# Patient Record
Sex: MALE | Race: OTHER | HISPANIC OR LATINO | URBAN - METROPOLITAN AREA
[De-identification: names, ages, dates, MRNs, and addresses within clinical notes are randomized per-mention and may not be internally consistent; named-entity substitution may affect disease eponyms.]

---

## 2021-11-28 ENCOUNTER — EMERGENCY (EMERGENCY)
Facility: HOSPITAL | Age: 36
LOS: 1 days | Discharge: HOME | End: 2021-11-28
Attending: STUDENT IN AN ORGANIZED HEALTH CARE EDUCATION/TRAINING PROGRAM
Payer: MEDICAID

## 2021-11-28 VITALS
RESPIRATION RATE: 18 BRPM | WEIGHT: 181.66 LBS | DIASTOLIC BLOOD PRESSURE: 69 MMHG | SYSTOLIC BLOOD PRESSURE: 115 MMHG | OXYGEN SATURATION: 98 % | TEMPERATURE: 98 F | HEART RATE: 85 BPM

## 2021-11-28 PROCEDURE — 99283 EMERGENCY DEPT VISIT LOW MDM: CPT | Mod: 25

## 2021-11-28 PROCEDURE — 93005 ELECTROCARDIOGRAM TRACING: CPT

## 2021-11-28 PROCEDURE — 99284 EMERGENCY DEPT VISIT MOD MDM: CPT

## 2021-11-28 RX ORDER — ACETAMINOPHEN 500 MG
975 TABLET ORAL ONCE
Refills: 0 | Status: COMPLETED | OUTPATIENT
Start: 2021-11-28 | End: 2021-11-28

## 2021-11-28 RX ADMIN — Medication 975 MILLIGRAM(S): at 20:00

## 2021-11-28 RX ADMIN — Medication 975 MILLIGRAM(S): at 17:44

## 2021-11-28 NOTE — ED PROVIDER NOTE - PROGRESS NOTE DETAILS
Pt feeling better. Refused XR despite explaining to pt that missing diagnoses including PNA/PTX could be life-threatening. Will f/u with his PCP this week and return if symptoms come back/worsen.

## 2021-11-28 NOTE — ED PROVIDER NOTE - PATIENT PORTAL LINK FT
You can access the FollowMyHealth Patient Portal offered by [facility] by registering at the following website: [email or web address]. By joining [employer]’s FollowMyHealth portal, you will also be able to view your health information using other applications (apps) compatible with our system.

## 2021-11-28 NOTE — ED PROVIDER NOTE - CLINICAL SUMMARY MEDICAL DECISION MAKING FREE TEXT BOX
35M with PMH including fibromyalgia p/w upper right back pain x 2 days. Worse on moving his right arm and on palpation of the area. Denies any other symptom. Symptoms c/w his fibromyalgia flares but more intense. Went to urgent care where he was given 60 mg of Toradol, states pain is better. Pt is well appearing, exam sig for R thoracic paraspinal tenderness, no bony tenderness, neurovascularly intact. Exam/history c/w msk etiology but will screen for PTX/ACS with CXR/EKG. No concern for PE or dissection based on exam/history.

## 2021-11-28 NOTE — ED ADULT NURSE NOTE - OBJECTIVE STATEMENT
the patient complaint of pain started this 2 am in the morning and  generates from right upper arm to the back and the head. Patient also report the pain on the feet.

## 2021-11-28 NOTE — ED PROVIDER NOTE - OBJECTIVE STATEMENT
35M with PMH including fibromyalgia p/w upper right back pain x 2 days. Worse on moving his right arm and on palpation of the area. Denies any other symptoms including fever, cough, chest pain, SOB, numbness, weakness, abd pain, N/V/D. States symptoms are c/w his fibromyalgia flares but more intense. Went to urgent care where he was given 60 mg of Toradol and referred to the ED for further eval.

## 2021-11-28 NOTE — ED PROVIDER NOTE - PHYSICAL EXAMINATION
Gen: AAOx3, non-toxic  Head: NCAT  HEENT: EOMI, oral mucosa moist, normal conjunctiva  Lung: CTAB, no respiratory distress, no wheezes/rhonchi/rales B/L, speaking in full sentences  CV: RRR, no murmurs, rubs or gallops, radial pulses intact and equal b/l   Abd: soft, NTND, no guarding, no CVA tenderness  MSK: +tenderness over the right thoracic paraspinals, +right thoracic paraspinal pain on active and passive ROM of right arm, no midline spinal tenderness, no other areas of bony tenderness, no visible deformities  Neuro: No focal sensory or motor deficits, normal CN exam   Skin: Warm, well perfused, no rash  Psych: normal affect.     Georgi Landry, DO

## 2021-11-28 NOTE — ED ADULT TRIAGE NOTE - CHIEF COMPLAINT QUOTE
Patient sent by urgent care for RT arm pain radiating to RT thoracic area, hx of chronic fibromyalgias with acute flare up today

## 2021-11-28 NOTE — ED PROVIDER NOTE - NS ED ROS FT
ROS:  GENERAL: No fever, no chills  EYES: no change in vision  HEENT: no trouble swallowing, no trouble speaking  CARDIAC: no chest pain  PULMONARY: no cough, no shortness of breath  GI: no abdominal pain, no nausea, no vomiting, no diarrhea, no constipation  : No dysuria, no frequency, no change in appearance, or odor of urine  SKIN: no rashes  NEURO: no headache, no weakness  MSK: +back pain     Georgi Landry, DO

## 2022-02-09 NOTE — ED ADULT NURSE NOTE - NSFALLRSKHARMRISK_ED_ALL_ED
no
